# Patient Record
Sex: MALE | Race: WHITE
[De-identification: names, ages, dates, MRNs, and addresses within clinical notes are randomized per-mention and may not be internally consistent; named-entity substitution may affect disease eponyms.]

---

## 2019-08-04 PROBLEM — Z00.00 ENCOUNTER FOR PREVENTIVE HEALTH EXAMINATION: Status: ACTIVE | Noted: 2019-08-04

## 2019-09-03 ENCOUNTER — APPOINTMENT (OUTPATIENT)
Dept: PULMONOLOGY | Facility: CLINIC | Age: 69
End: 2019-09-03
Payer: MEDICARE

## 2019-09-03 VITALS — HEIGHT: 76 IN | WEIGHT: 223 LBS | BODY MASS INDEX: 27.16 KG/M2

## 2019-09-03 VITALS — HEART RATE: 63 BPM | DIASTOLIC BLOOD PRESSURE: 78 MMHG | OXYGEN SATURATION: 92 % | SYSTOLIC BLOOD PRESSURE: 130 MMHG

## 2019-09-03 DIAGNOSIS — Z86.79 PERSONAL HISTORY OF OTHER DISEASES OF THE CIRCULATORY SYSTEM: ICD-10-CM

## 2019-09-03 DIAGNOSIS — Z82.49 FAMILY HISTORY OF ISCHEMIC HEART DISEASE AND OTHER DISEASES OF THE CIRCULATORY SYSTEM: ICD-10-CM

## 2019-09-03 DIAGNOSIS — Z86.39 PERSONAL HISTORY OF OTHER ENDOCRINE, NUTRITIONAL AND METABOLIC DISEASE: ICD-10-CM

## 2019-09-03 DIAGNOSIS — I10 ESSENTIAL (PRIMARY) HYPERTENSION: ICD-10-CM

## 2019-09-03 DIAGNOSIS — Z80.9 FAMILY HISTORY OF MALIGNANT NEOPLASM, UNSPECIFIED: ICD-10-CM

## 2019-09-03 PROCEDURE — 85018 HEMOGLOBIN: CPT | Mod: QW

## 2019-09-03 PROCEDURE — 94727 GAS DIL/WSHOT DETER LNG VOL: CPT

## 2019-09-03 PROCEDURE — 94729 DIFFUSING CAPACITY: CPT

## 2019-09-03 PROCEDURE — 94060 EVALUATION OF WHEEZING: CPT

## 2019-09-03 PROCEDURE — 99204 OFFICE O/P NEW MOD 45 MIN: CPT | Mod: 25

## 2019-09-03 PROCEDURE — G0296 VISIT TO DETERM LDCT ELIG: CPT

## 2019-09-03 PROCEDURE — 99406 BEHAV CHNG SMOKING 3-10 MIN: CPT

## 2019-09-03 PROCEDURE — 94664 DEMO&/EVAL PT USE INHALER: CPT | Mod: 59

## 2019-09-03 NOTE — PHYSICAL EXAM
[General Appearance - Well Developed] : well developed [Normal Appearance] : normal appearance [Well Groomed] : well groomed [No Deformities] : no deformities [General Appearance - Well Nourished] : well nourished [Normal Conjunctiva] : the conjunctiva exhibited no abnormalities [General Appearance - In No Acute Distress] : no acute distress [Eyelids - No Xanthelasma] : the eyelids demonstrated no xanthelasmas [Normal Oropharynx] : normal oropharynx [Neck Appearance] : the appearance of the neck was normal [Neck Cervical Mass (___cm)] : no neck mass was observed [Jugular Venous Distention Increased] : there was no jugular-venous distention [Thyroid Diffuse Enlargement] : the thyroid was not enlarged [Thyroid Nodule] : there were no palpable thyroid nodules [Heart Rate And Rhythm] : heart rate and rhythm were normal [Heart Sounds] : normal S1 and S2 [Murmurs] : no murmurs present [Respiration, Rhythm And Depth] : normal respiratory rhythm and effort [Exaggerated Use Of Accessory Muscles For Inspiration] : no accessory muscle use [Auscultation Breath Sounds / Voice Sounds] : lungs were clear to auscultation bilaterally [Abdomen Soft] : soft [Abdomen Tenderness] : non-tender [Abdomen Mass (___ Cm)] : no abdominal mass palpated [Abnormal Walk] : normal gait [Gait - Sufficient For Exercise Testing] : the gait was sufficient for exercise testing [Nail Clubbing] : no clubbing of the fingernails [Cyanosis, Localized] : no localized cyanosis [Petechial Hemorrhages (___cm)] : no petechial hemorrhages [] : no rash [Skin Turgor] : normal skin turgor [Skin Color & Pigmentation] : normal skin color and pigmentation [Deep Tendon Reflexes (DTR)] : deep tendon reflexes were 2+ and symmetric [Sensation] : the sensory exam was normal to light touch and pinprick [No Focal Deficits] : no focal deficits [FreeTextEntry1] : Normal

## 2019-09-03 NOTE — PROCEDURE
[FreeTextEntry1] : Full pulmonary function test performed demonstrates a severe degree of airways obstruction with a significant postbronchodilator response. Lung volumes demonstrate a markedly elevated FRC, RV and RV/TLC ratio. Diffusion capacity is severely reduced.

## 2019-09-03 NOTE — CONSULT LETTER
[Consult Letter:] : I had the pleasure of evaluating your patient, [unfilled]. [Consult Closing:] : Thank you very much for allowing me to participate in the care of this patient.  If you have any questions, please do not hesitate to contact me. [Sincerely,] : Sincerely, [DrGerson  ___] : Dr. MADDOX [FreeTextEntry3] : Antione Wright MD FCCP\par Pulmonary/Critical Care/Sleep Medicine\par Department of Internal Medicine\par \par [facility] [employer]\par

## 2019-09-03 NOTE — DISCUSSION/SUMMARY
[FreeTextEntry1] : 69-year-old male, seen today for complaints of dyspnea on exertion. Patient's lung function is consistent with stage IV chronic obstructive lung disease. Patient is being started on a combination long-acting beta agonist, anti-cholinergic, inhaled corticosteroid with rescue short-acting bronchodilator. 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 This method was described to the patient with criteria for greater than 30 pack years of smoking, over the age of 55, and screening for 15 years following cessation of tobacco use. Pt disinterested in screening\par \par Smoking cessation was discussed with the patient for approximately 10 minutes. This included the various options including self discontinuation, nicotine replacements, antidepressants, and nicotine antagonist/agonist (Chantix). Patient disinterested in pursuing therapy except for self discontinuation\par \par

## 2019-09-03 NOTE — HISTORY OF PRESENT ILLNESS
[FreeTextEntry1] : Pleasant 69-year-old male with a history of coronary artery disease, and peripheral vascular disease, seen today for pulmonary evaluation. Patient has a greater than 75 pack year history of cigarette smoking. Once smoking 3 packs per day currently at one pack per day. Over the course of this year. The patient has noted decrease in exercise tolerance. He does note morning cough, productive of clear sputum with only rare intermittent wheeze without sleep disturbance. Symptoms are worse during hot and humid conditions. There are no reduction in his and his activities of daily living. He denies any hemoptysis, weight loss.

## 2019-10-15 ENCOUNTER — APPOINTMENT (OUTPATIENT)
Dept: PULMONOLOGY | Facility: CLINIC | Age: 69
End: 2019-10-15
Payer: MEDICARE

## 2019-10-15 VITALS — BODY MASS INDEX: 28.95 KG/M2 | WEIGHT: 228 LBS | HEIGHT: 74.5 IN

## 2019-10-15 VITALS — SYSTOLIC BLOOD PRESSURE: 120 MMHG | DIASTOLIC BLOOD PRESSURE: 70 MMHG | HEART RATE: 60 BPM

## 2019-10-15 VITALS — OXYGEN SATURATION: 93 %

## 2019-10-15 DIAGNOSIS — I25.10 ATHEROSCLEROTIC HEART DISEASE OF NATIVE CORONARY ARTERY W/OUT ANGINA PECTORIS: ICD-10-CM

## 2019-10-15 PROCEDURE — 94010 BREATHING CAPACITY TEST: CPT

## 2019-10-15 PROCEDURE — 99214 OFFICE O/P EST MOD 30 MIN: CPT | Mod: 25

## 2019-10-15 NOTE — DISCUSSION/SUMMARY
[Improving] : improving [COPD] : chronic obstructive pulmonary disease [Stage IV (Very Severe)] : stage IV (very severe) [None] : There are no changes in medication management [FreeTextEntry1] : Smoking cessation was discussed with the patient for approximately 10 minutes. This included the various options including self discontinuation, nicotine replacements, antidepressants, and nicotine antagonist/agonist (Chantix).\par \par Purse lip breathing demonstrated.\par \par Refusing nebulizer at this time as well as CCT

## 2019-10-15 NOTE — PHYSICAL EXAM
[Normal Appearance] : normal appearance [General Appearance - Well Developed] : well developed [General Appearance - Well Nourished] : well nourished [No Deformities] : no deformities [Well Groomed] : well groomed [Normal Conjunctiva] : the conjunctiva exhibited no abnormalities [General Appearance - In No Acute Distress] : no acute distress [Normal Oropharynx] : normal oropharynx [Eyelids - No Xanthelasma] : the eyelids demonstrated no xanthelasmas [Neck Appearance] : the appearance of the neck was normal [Neck Cervical Mass (___cm)] : no neck mass was observed [Thyroid Diffuse Enlargement] : the thyroid was not enlarged [Jugular Venous Distention Increased] : there was no jugular-venous distention [Thyroid Nodule] : there were no palpable thyroid nodules [Heart Sounds] : normal S1 and S2 [Heart Rate And Rhythm] : heart rate and rhythm were normal [Murmurs] : no murmurs present [Exaggerated Use Of Accessory Muscles For Inspiration] : no accessory muscle use [Respiration, Rhythm And Depth] : normal respiratory rhythm and effort [Auscultation Breath Sounds / Voice Sounds] : lungs were clear to auscultation bilaterally [Abdomen Tenderness] : non-tender [Abdomen Soft] : soft [Abdomen Mass (___ Cm)] : no abdominal mass palpated [Nail Clubbing] : no clubbing of the fingernails [Abnormal Walk] : normal gait [Gait - Sufficient For Exercise Testing] : the gait was sufficient for exercise testing [Petechial Hemorrhages (___cm)] : no petechial hemorrhages [Cyanosis, Localized] : no localized cyanosis [Sensation] : the sensory exam was normal to light touch and pinprick [] : no ischemic changes [Deep Tendon Reflexes (DTR)] : deep tendon reflexes were 2+ and symmetric [No Focal Deficits] : no focal deficits [FreeTextEntry1] : Normal

## 2020-02-11 ENCOUNTER — APPOINTMENT (OUTPATIENT)
Dept: PULMONOLOGY | Facility: CLINIC | Age: 70
End: 2020-02-11
Payer: MEDICARE

## 2020-02-11 VITALS — BODY MASS INDEX: 30.02 KG/M2 | WEIGHT: 237 LBS

## 2020-02-11 VITALS — HEART RATE: 66 BPM | OXYGEN SATURATION: 91 % | SYSTOLIC BLOOD PRESSURE: 130 MMHG | DIASTOLIC BLOOD PRESSURE: 80 MMHG

## 2020-02-11 VITALS — BODY MASS INDEX: 30.02 KG/M2 | HEIGHT: 74.5 IN

## 2020-02-11 PROCEDURE — 99214 OFFICE O/P EST MOD 30 MIN: CPT | Mod: 25

## 2020-02-11 PROCEDURE — 94010 BREATHING CAPACITY TEST: CPT

## 2020-02-11 RX ORDER — AMLODIPINE BESYLATE 5 MG/1
5 TABLET ORAL
Refills: 0 | Status: ACTIVE | COMMUNITY

## 2020-02-11 RX ORDER — EZETIMIBE 10 MG/1
10 TABLET ORAL
Refills: 0 | Status: ACTIVE | COMMUNITY

## 2020-02-11 NOTE — PHYSICAL EXAM
[No Acute Distress] : no acute distress [Normal Oropharynx] : normal oropharynx [Normal Appearance] : normal appearance [Normal Rate/Rhythm] : normal rate/rhythm [No Neck Mass] : no neck mass [No Murmurs] : no murmurs [No Resp Distress] : no resp distress [Normal S1, S2] : normal s1, s2 [No Abnormalities] : no abnormalities [Clear to Auscultation Bilaterally] : clear to auscultation bilaterally [Benign] : benign [Normal Gait] : normal gait [No Cyanosis] : no cyanosis [No Edema] : no edema [No Clubbing] : no clubbing [Normal Color/ Pigmentation] : normal color/ pigmentation [FROM] : FROM [No Focal Deficits] : no focal deficits [Oriented x3] : oriented x3 [Normal Affect] : normal affect

## 2020-02-11 NOTE — HISTORY OF PRESENT ILLNESS
[Doing Well] : doing well [Difficulty Breathing During Exertion] : improved dyspnea on exertion [Cough] : denies coughing [Feelings Of Weakness On Exertion] : improved exercise intolerance [Coughing Up Sputum] : denies coughing up sputum [Regional Soft Tissue Swelling Both Lower Extremities] : denies lower extremity edema [Wheezing] : improved wheezing [PFTs] : pulmonary function tests [>= 30 pack years] : >= 30 pack years [Current] : current [Class II - Mild Symptoms and Slight Limitations] : II [Wt Gain ___ Lbs] : no recent weight gain [TextBox_11] : 1/2 [de-identified] : PVD

## 2020-02-11 NOTE — DISCUSSION/SUMMARY
[Improving] : improving [COPD] : chronic obstructive pulmonary disease [Stage IV (Very Severe)] : stage IV (very severe) [None] : There are no changes in medication management [Responding to Treatment] : responding to treatment [FreeTextEntry1] : Smoking cessation was discussed with the patient for approximately 10 minutes. This included the various options including self discontinuation, nicotine replacements, antidepressants, and nicotine antagonist/agonist (Chantix).\par \par Still refusing CCT

## 2020-02-20 ENCOUNTER — RX RENEWAL (OUTPATIENT)
Age: 70
End: 2020-02-20

## 2020-09-01 ENCOUNTER — APPOINTMENT (OUTPATIENT)
Dept: PULMONOLOGY | Facility: CLINIC | Age: 70
End: 2020-09-01
Payer: MEDICARE

## 2020-09-01 ENCOUNTER — TRANSCRIPTION ENCOUNTER (OUTPATIENT)
Age: 70
End: 2020-09-01

## 2020-09-01 VITALS
HEART RATE: 74 BPM | WEIGHT: 245 LBS | BODY MASS INDEX: 31.44 KG/M2 | OXYGEN SATURATION: 93 % | DIASTOLIC BLOOD PRESSURE: 74 MMHG | HEIGHT: 74 IN | SYSTOLIC BLOOD PRESSURE: 138 MMHG

## 2020-09-01 DIAGNOSIS — Z71.6 TOBACCO ABUSE COUNSELING: ICD-10-CM

## 2020-09-01 DIAGNOSIS — Z12.9 ENCOUNTER FOR SCREENING FOR MALIGNANT NEOPLASM, SITE UNSPECIFIED: ICD-10-CM

## 2020-09-01 PROCEDURE — 99406 BEHAV CHNG SMOKING 3-10 MIN: CPT

## 2020-09-01 PROCEDURE — G0296 VISIT TO DETERM LDCT ELIG: CPT

## 2020-09-01 PROCEDURE — 99214 OFFICE O/P EST MOD 30 MIN: CPT | Mod: 25

## 2020-09-01 NOTE — DISCUSSION/SUMMARY
[COPD] : chronic obstructive pulmonary disease [Improving] : improving [Responding to Treatment] : responding to treatment [Stage IV (Very Severe)] : stage IV (very severe) [None] : There are no changes in medication management

## 2020-09-01 NOTE — HISTORY OF PRESENT ILLNESS
[Doing Well] : doing well [Difficulty Breathing During Exertion] : improved dyspnea on exertion [Feelings Of Weakness On Exertion] : improved exercise intolerance [Cough] : denies coughing [Coughing Up Sputum] : denies coughing up sputum [Wheezing] : improved wheezing [Regional Soft Tissue Swelling Both Lower Extremities] : denies lower extremity edema [PFTs] : pulmonary function tests [Cigarette ___ppd] : [unfilled] cigarette pack(s) per day [Wt Gain ___ Lbs] : no recent weight gain [de-identified] : PVD [de-identified] : Pt refusing CCT screening [TextBox_3] : 4

## 2020-10-16 DIAGNOSIS — Z23 ENCOUNTER FOR IMMUNIZATION: ICD-10-CM

## 2021-02-22 ENCOUNTER — APPOINTMENT (OUTPATIENT)
Dept: PULMONOLOGY | Facility: CLINIC | Age: 71
End: 2021-02-22
Payer: MEDICARE

## 2021-02-22 VITALS
RESPIRATION RATE: 18 BRPM | OXYGEN SATURATION: 94 % | HEIGHT: 74 IN | HEART RATE: 76 BPM | SYSTOLIC BLOOD PRESSURE: 130 MMHG | DIASTOLIC BLOOD PRESSURE: 74 MMHG | WEIGHT: 247 LBS | TEMPERATURE: 98 F | BODY MASS INDEX: 31.7 KG/M2

## 2021-02-22 PROCEDURE — 90732 PPSV23 VACC 2 YRS+ SUBQ/IM: CPT

## 2021-02-22 PROCEDURE — G0296 VISIT TO DETERM LDCT ELIG: CPT

## 2021-02-22 PROCEDURE — G0009: CPT

## 2021-02-22 PROCEDURE — 99214 OFFICE O/P EST MOD 30 MIN: CPT | Mod: 25

## 2021-02-22 PROCEDURE — 99406 BEHAV CHNG SMOKING 3-10 MIN: CPT

## 2021-02-22 NOTE — CONSULT LETTER
[Dear  ___] : Dear  [unfilled], [Please see my note below.] : Please see my note below. [Consult Closing:] : Thank you very much for allowing me to participate in the care of this patient.  If you have any questions, please do not hesitate to contact me. [Sincerely,] : Sincerely, [FreeTextEntry3] : Antione Wright MD FCCP\par Pulmonary/Critical Care/Sleep Medicine\par Department of Internal Medicine\par \par [facility] [employer]\par

## 2021-02-22 NOTE — HISTORY OF PRESENT ILLNESS
[Doing Well] : doing well [Difficulty Breathing During Exertion] : improved dyspnea on exertion [Feelings Of Weakness On Exertion] : improved exercise intolerance [Cough] : denies coughing [Coughing Up Sputum] : denies coughing up sputum [Wheezing] : improved wheezing [Regional Soft Tissue Swelling Both Lower Extremities] : denies lower extremity edema [Cigarette ___ppd] : [unfilled] cigarette pack(s) per day [PFTs] : pulmonary function tests [Wt Gain ___ Lbs] : no recent weight gain [de-identified] : PVD [de-identified] : Pt refusing CCT screening [ESS] : 0

## 2021-02-22 NOTE — COUNSELING
[Risk of tobacco use and health benefits of smoking cessation discussed] : Risk of tobacco use and health benefits of smoking cessation discussed [Cessation strategies including cessation program discussed] : Cessation strategies including cessation program discussed [Use of nicotine replacement therapies and other medications discussed] : Use of nicotine replacement therapies and other medications discussed [Encouraged to pick a quit date and identify support needed to quit] : Encouraged to pick a quit date and identify support needed to quit [Willing to Quit Smoking] : Willing to quit smoking [Tobacco Use Cessation Intermediate Greater Than 3 Minutes Up to 10 Minutes] : Tobacco Use Cessation Intermediate Greater Than 3 Minutes Up to 10 Minutes [FreeTextEntry2] : current smoker [ - Annual Lung Cancer Screening/Share Decision Making Discussion] : Annual Lung Cancer Screening/Share Decision Making Discussion. (I have advised this patient to have a Low Dose CT (LDCT) scan of the lungs and have discussed the following with the patient in a shared decision making discussion:   Benefits of Detection and Early Treatment: There is adequate evidence that annual screening for lung cancer with LDCT in a population of high-risk persons can prevent a substantial number of lung cancer–related deaths. The magnitude of benefit depends on the individual patient's risk for lung cancer, as those who are at highest risk are most likely to benefit. Screening cannot prevent most lung cancer–related deaths, and does not replace smoking cessation. Harms of Detection and Early Intervention and Treatment: The harms associated with LDCT screening include false-negative and false-positive results, incidental findings, over diagnosis, and radiation exposure. False-positive LDCT results occur in a substantial proportion of screened persons; 95% of all positive results do not lead to a diagnosis of cancer. In a high-quality screening program, further imaging can resolve most false-positive results; however, some patients may require invasive procedures. Radiation harms, including cancer resulting from cumulative exposure to radiation, vary depending on the age at the start of screening; the number of scans received; and the person's exposure to other sources of radiation, particularly other medical imaging.)

## 2021-07-27 ENCOUNTER — NON-APPOINTMENT (OUTPATIENT)
Age: 71
End: 2021-07-27

## 2021-08-16 ENCOUNTER — APPOINTMENT (OUTPATIENT)
Dept: DISASTER EMERGENCY | Facility: CLINIC | Age: 71
End: 2021-08-16

## 2021-09-13 DIAGNOSIS — Z01.818 ENCOUNTER FOR OTHER PREPROCEDURAL EXAMINATION: ICD-10-CM

## 2021-09-14 ENCOUNTER — APPOINTMENT (OUTPATIENT)
Dept: DISASTER EMERGENCY | Facility: CLINIC | Age: 71
End: 2021-09-14

## 2021-09-15 LAB — SARS-COV-2 N GENE NPH QL NAA+PROBE: NOT DETECTED

## 2021-09-17 ENCOUNTER — APPOINTMENT (OUTPATIENT)
Dept: PULMONOLOGY | Facility: CLINIC | Age: 71
End: 2021-09-17

## 2021-09-17 ENCOUNTER — APPOINTMENT (OUTPATIENT)
Dept: PULMONOLOGY | Facility: CLINIC | Age: 71
End: 2021-09-17
Payer: MEDICARE

## 2021-09-17 VITALS — HEIGHT: 75 IN | WEIGHT: 252 LBS | BODY MASS INDEX: 31.33 KG/M2

## 2021-09-17 VITALS — DIASTOLIC BLOOD PRESSURE: 70 MMHG | SYSTOLIC BLOOD PRESSURE: 124 MMHG

## 2021-09-17 VITALS — OXYGEN SATURATION: 95 % | HEART RATE: 68 BPM

## 2021-09-17 PROCEDURE — G0296 VISIT TO DETERM LDCT ELIG: CPT

## 2021-09-17 PROCEDURE — 94010 BREATHING CAPACITY TEST: CPT

## 2021-09-17 PROCEDURE — 99214 OFFICE O/P EST MOD 30 MIN: CPT | Mod: 25

## 2021-09-17 NOTE — CONSULT LETTER
[Dear  ___] : Dear  [unfilled], [Please see my note below.] : Please see my note below. [Consult Closing:] : Thank you very much for allowing me to participate in the care of this patient.  If you have any questions, please do not hesitate to contact me. [Sincerely,] : Sincerely, [FreeTextEntry3] : Antione Wirght MD FCCP\par Pulmonary/Critical Care/Sleep Medicine\par Department of Internal Medicine\par \par [facility] [employer]\par

## 2021-09-17 NOTE — HISTORY OF PRESENT ILLNESS
[Doing Well] : doing well [Difficulty Breathing During Exertion] : improved dyspnea on exertion [Feelings Of Weakness On Exertion] : improved exercise intolerance [Cough] : denies coughing [Coughing Up Sputum] : denies coughing up sputum [Wheezing] : improved wheezing [Regional Soft Tissue Swelling Both Lower Extremities] : denies lower extremity edema [Cigarette ___ppd] : [unfilled] cigarette pack(s) per day [PFTs] : pulmonary function tests [Wt Gain ___ Lbs] : no recent weight gain [de-identified] : PVD [de-identified] : Pt refusing CCT screening [ESS] : 0

## 2021-09-17 NOTE — DISCUSSION/SUMMARY
[COPD] : chronic obstructive pulmonary disease [Responding to Treatment] : responding to treatment [Stage IV (Very Severe)] : stage IV (very severe) [None] : There are no changes in medication management [Decompensated] : decompensated [FreeTextEntry1] : Pt has decided not to pursue LDCT

## 2021-09-17 NOTE — COUNSELING
[Willing to Quit Smoking] : Willing to quit smoking [ - Annual Lung Cancer Screening/Share Decision Making Discussion] : Annual Lung Cancer Screening/Share Decision Making Discussion. (I have advised this patient to have a Low Dose CT (LDCT) scan of the lungs and have discussed the following with the patient in a shared decision making discussion:   Benefits of Detection and Early Treatment: There is adequate evidence that annual screening for lung cancer with LDCT in a population of high-risk persons can prevent a substantial number of lung cancer–related deaths. The magnitude of benefit depends on the individual patient's risk for lung cancer, as those who are at highest risk are most likely to benefit. Screening cannot prevent most lung cancer–related deaths, and does not replace smoking cessation. Harms of Detection and Early Intervention and Treatment: The harms associated with LDCT screening include false-negative and false-positive results, incidental findings, over diagnosis, and radiation exposure. False-positive LDCT results occur in a substantial proportion of screened persons; 95% of all positive results do not lead to a diagnosis of cancer. In a high-quality screening program, further imaging can resolve most false-positive results; however, some patients may require invasive procedures. Radiation harms, including cancer resulting from cumulative exposure to radiation, vary depending on the age at the start of screening; the number of scans received; and the person's exposure to other sources of radiation, particularly other medical imaging.)

## 2021-10-06 PROBLEM — I10 ESSENTIAL HYPERTENSION: Status: ACTIVE | Noted: 2019-09-03

## 2021-11-11 ENCOUNTER — APPOINTMENT (OUTPATIENT)
Dept: DISASTER EMERGENCY | Facility: CLINIC | Age: 71
End: 2021-11-11

## 2021-11-12 LAB — SARS-COV-2 N GENE NPH QL NAA+PROBE: NOT DETECTED

## 2021-11-15 ENCOUNTER — APPOINTMENT (OUTPATIENT)
Dept: PULMONOLOGY | Facility: CLINIC | Age: 71
End: 2021-11-15
Payer: MEDICARE

## 2021-11-15 VITALS — BODY MASS INDEX: 30.59 KG/M2 | HEIGHT: 75 IN | WEIGHT: 246 LBS

## 2021-11-15 VITALS — SYSTOLIC BLOOD PRESSURE: 120 MMHG | DIASTOLIC BLOOD PRESSURE: 60 MMHG

## 2021-11-15 VITALS — RESPIRATION RATE: 16 BRPM | OXYGEN SATURATION: 95 % | HEART RATE: 54 BPM

## 2021-11-15 PROCEDURE — 94010 BREATHING CAPACITY TEST: CPT

## 2021-11-15 PROCEDURE — 99214 OFFICE O/P EST MOD 30 MIN: CPT | Mod: 25

## 2021-11-15 RX ORDER — PREDNISONE 10 MG/1
10 TABLET ORAL
Qty: 42 | Refills: 0 | Status: DISCONTINUED | COMMUNITY
Start: 2021-09-17 | End: 2021-11-15

## 2021-11-15 NOTE — HISTORY OF PRESENT ILLNESS
[Former] : former [Doing Well] : doing well [Difficulty Breathing During Exertion] : improved dyspnea on exertion [Feelings Of Weakness On Exertion] : improved exercise intolerance [Cough] : denies coughing [Coughing Up Sputum] : denies coughing up sputum [Wheezing] : improved wheezing [Regional Soft Tissue Swelling Both Lower Extremities] : denies lower extremity edema [Cigarette ___ppd] : [unfilled] cigarette pack(s) per day [PFTs] : pulmonary function tests [YearQuit] : 2021 [TextBox_29] : STOPPED SMOKING SEVEN WKS AGO [ESS] : 0 [Wt Gain ___ Lbs] : no recent weight gain [de-identified] : PVD [de-identified] : Pt refusing CCT screening

## 2021-11-15 NOTE — CONSULT LETTER
[Dear  ___] : Dear  [unfilled], [Please see my note below.] : Please see my note below. [Consult Closing:] : Thank you very much for allowing me to participate in the care of this patient.  If you have any questions, please do not hesitate to contact me. [Sincerely,] : Sincerely, [FreeTextEntry3] : Antione Wright MD FCCP\par Pulmonary/Critical Care/Sleep Medicine\par Department of Internal Medicine\par \par [facility] [employer]\par

## 2022-05-16 ENCOUNTER — APPOINTMENT (OUTPATIENT)
Dept: PULMONOLOGY | Facility: CLINIC | Age: 72
End: 2022-05-16
Payer: MEDICARE

## 2022-05-16 VITALS — HEIGHT: 74 IN | BODY MASS INDEX: 34.14 KG/M2 | WEIGHT: 266 LBS

## 2022-05-16 PROCEDURE — 94010 BREATHING CAPACITY TEST: CPT

## 2022-06-23 ENCOUNTER — APPOINTMENT (OUTPATIENT)
Dept: PULMONOLOGY | Facility: CLINIC | Age: 72
End: 2022-06-23
Payer: MEDICARE

## 2022-06-23 VITALS
HEART RATE: 73 BPM | WEIGHT: 250 LBS | SYSTOLIC BLOOD PRESSURE: 140 MMHG | OXYGEN SATURATION: 96 % | RESPIRATION RATE: 16 BRPM | BODY MASS INDEX: 32.1 KG/M2 | DIASTOLIC BLOOD PRESSURE: 80 MMHG

## 2022-06-23 PROCEDURE — 99214 OFFICE O/P EST MOD 30 MIN: CPT

## 2022-06-23 RX ORDER — APIXABAN 5 MG/1
5 TABLET, FILM COATED ORAL
Qty: 60 | Refills: 0 | Status: ACTIVE | COMMUNITY
Start: 2022-04-07

## 2022-06-23 NOTE — DISCUSSION/SUMMARY
[COPD] : chronic obstructive pulmonary disease [Improving] : improving [Responding to Treatment] : responding to treatment [Stage IV (Very Severe)] : stage IV (very severe) [None] : There are no changes in medication management [FreeTextEntry1] : Refusing LDCT [Pulmonary Rehabilitation] : pulmonary rehabilitation [de-identified] : in fall

## 2022-06-23 NOTE — HISTORY OF PRESENT ILLNESS
[Former] : former [>= 20 pack years] : >= 20 pack years [Doing Well] : doing well [Difficulty Breathing During Exertion] : improved dyspnea on exertion [Feelings Of Weakness On Exertion] : improved exercise intolerance [Cough] : denies coughing [Coughing Up Sputum] : denies coughing up sputum [Wheezing] : improved wheezing [Regional Soft Tissue Swelling Both Lower Extremities] : denies lower extremity edema [Cigarette ___ppd] : [unfilled] cigarette pack(s) per day [PFTs] : pulmonary function tests [YearQuit] : 2021 [ESS] : 0 [Wt Gain ___ Lbs] : no recent weight gain [de-identified] : PVD [de-identified] : Pt refusing CCT screening

## 2022-06-23 NOTE — CONSULT LETTER
[Dear  ___] : Dear  [unfilled], [Please see my note below.] : Please see my note below. [Consult Closing:] : Thank you very much for allowing me to participate in the care of this patient.  If you have any questions, please do not hesitate to contact me. [Sincerely,] : Sincerely, [FreeTextEntry3] : Antione Wright MD FCCP\par Pulmonary/Critical Care/Sleep Medicine\par Department of Internal Medicine\par \par [facility] [employer]\par

## 2022-10-17 ENCOUNTER — APPOINTMENT (OUTPATIENT)
Dept: PULMONOLOGY | Facility: CLINIC | Age: 72
End: 2022-10-17

## 2022-10-17 VITALS
DIASTOLIC BLOOD PRESSURE: 76 MMHG | HEART RATE: 71 BPM | RESPIRATION RATE: 16 BRPM | OXYGEN SATURATION: 95 % | SYSTOLIC BLOOD PRESSURE: 134 MMHG

## 2022-10-17 VITALS — WEIGHT: 230 LBS | BODY MASS INDEX: 29.52 KG/M2 | HEIGHT: 74 IN

## 2022-10-17 DIAGNOSIS — I73.9 PERIPHERAL VASCULAR DISEASE, UNSPECIFIED: ICD-10-CM

## 2022-10-17 LAB — SARS-COV-2 N GENE NPH QL NAA+PROBE: NOT DETECTED

## 2022-10-17 PROCEDURE — G0296 VISIT TO DETERM LDCT ELIG: CPT

## 2022-10-17 PROCEDURE — 94010 BREATHING CAPACITY TEST: CPT

## 2022-10-17 PROCEDURE — 99214 OFFICE O/P EST MOD 30 MIN: CPT | Mod: 25

## 2022-10-17 RX ORDER — METOPROLOL SUCCINATE 50 MG/1
50 TABLET, EXTENDED RELEASE ORAL
Refills: 0 | Status: DISCONTINUED | COMMUNITY
End: 2022-10-17

## 2022-10-17 RX ORDER — AMOXICILLIN 500 MG/1
500 CAPSULE ORAL
Qty: 21 | Refills: 0 | Status: DISCONTINUED | COMMUNITY
Start: 2022-03-10 | End: 2022-10-17

## 2022-10-17 RX ORDER — BLOOD SUGAR DIAGNOSTIC
STRIP MISCELLANEOUS
Qty: 8 | Refills: 0 | Status: DISCONTINUED | COMMUNITY
Start: 2022-05-19 | End: 2022-10-17

## 2022-10-17 RX ORDER — CHLORHEXIDINE GLUCONATE, 0.12% ORAL RINSE 1.2 MG/ML
0.12 SOLUTION DENTAL
Qty: 473 | Refills: 0 | Status: DISCONTINUED | COMMUNITY
Start: 2022-03-10 | End: 2022-10-17

## 2022-10-17 NOTE — HISTORY OF PRESENT ILLNESS
[Former] : former [>= 20 pack years] : >= 20 pack years [Doing Well] : doing well [Difficulty Breathing During Exertion] : stable dyspnea on exertion [Feelings Of Weakness On Exertion] : stable exercise intolerance [Cough] : denies coughing [Coughing Up Sputum] : denies coughing up sputum [Wheezing] : resolved wheezing [Regional Soft Tissue Swelling Both Lower Extremities] : denies lower extremity edema [Cigarette ___ppd] : [unfilled] cigarette pack(s) per day [PFTs] : pulmonary function tests [YearQuit] : 2021 [ESS] : 0 [Wt Gain ___ Lbs] : no recent weight gain [de-identified] : PVD [de-identified] : Pt refusing CCT screening

## 2022-10-17 NOTE — DISCUSSION/SUMMARY
[COPD] : chronic obstructive pulmonary disease [Improving] : improving [Responding to Treatment] : responding to treatment [Stage IV (Very Severe)] : stage IV (very severe) [None] : There are no changes in medication management [Pulmonary Rehabilitation] : pulmonary rehabilitation [FreeTextEntry1] : Refusing LDCT [de-identified] : in fall

## 2022-10-17 NOTE — CONSULT LETTER
[Dear  ___] : Dear  [unfilled], [Please see my note below.] : Please see my note below. [Consult Closing:] : Thank you very much for allowing me to participate in the care of this patient.  If you have any questions, please do not hesitate to contact me. [Sincerely,] : Sincerely, [FreeTextEntry3] : Antione Wright MD FCCP\par Pulmonary/Critical Care/Sleep Medicine\par Department of Internal Medicine\par \par [facility] [employer] [facility]\par

## 2023-02-15 ENCOUNTER — APPOINTMENT (OUTPATIENT)
Dept: PULMONOLOGY | Facility: CLINIC | Age: 73
End: 2023-02-15
Payer: MEDICARE

## 2023-02-15 VITALS — BODY MASS INDEX: 29.34 KG/M2 | HEIGHT: 75 IN | WEIGHT: 236 LBS

## 2023-02-15 VITALS
RESPIRATION RATE: 16 BRPM | HEART RATE: 68 BPM | SYSTOLIC BLOOD PRESSURE: 120 MMHG | OXYGEN SATURATION: 97 % | DIASTOLIC BLOOD PRESSURE: 60 MMHG

## 2023-02-15 PROCEDURE — 94010 BREATHING CAPACITY TEST: CPT

## 2023-02-15 PROCEDURE — 99214 OFFICE O/P EST MOD 30 MIN: CPT | Mod: 25

## 2023-02-15 RX ORDER — ASPIRIN 325 MG/1
325 TABLET, FILM COATED ORAL
Refills: 0 | Status: COMPLETED | COMMUNITY
End: 2023-02-15

## 2023-02-15 RX ORDER — METOPROLOL SUCCINATE 25 MG/1
25 TABLET, EXTENDED RELEASE ORAL
Qty: 90 | Refills: 0 | Status: COMPLETED | COMMUNITY
Start: 2022-06-15 | End: 2023-02-15

## 2023-02-15 NOTE — HISTORY OF PRESENT ILLNESS
[Doing Well] : doing well [Difficulty Breathing During Exertion] : stable dyspnea on exertion [Feelings Of Weakness On Exertion] : stable exercise intolerance [Cough] : denies coughing [Coughing Up Sputum] : denies coughing up sputum [Wheezing] : resolved wheezing [Regional Soft Tissue Swelling Both Lower Extremities] : denies lower extremity edema [Cigarette ___ppd] : [unfilled] cigarette pack(s) per day [PFTs] : pulmonary function tests [ESS] : 0 [Wt Gain ___ Lbs] : no recent weight gain [de-identified] : PVD [de-identified] : Covid 19 12/26-1/1 [de-identified] : Pt refusing CCT screening.  Legs feel somewhat weaker since COVID.

## 2023-02-15 NOTE — CONSULT LETTER
[Dear  ___] : Dear  [unfilled], [Please see my note below.] : Please see my note below. [Consult Closing:] : Thank you very much for allowing me to participate in the care of this patient.  If you have any questions, please do not hesitate to contact me. [Sincerely,] : Sincerely, [FreeTextEntry3] : Antione Wright MD FCCP\par Pulmonary/Critical Care/Sleep Medicine\par Department of Internal Medicine\par \par [facility]\par

## 2023-02-15 NOTE — PROCEDURE
[___%] : last visit [unfilled]U% [Spirometry] : improving [___%] : current visit [unfilled]U% [FreeTextEntry1] : \par

## 2023-02-15 NOTE — DISCUSSION/SUMMARY
[COPD] : chronic obstructive pulmonary disease [Improving] : improving [Responding to Treatment] : responding to treatment [Stage IV (Very Severe)] : stage IV (very severe) [None] : There are no changes in medication management [Pulmonary Rehabilitation] : pulmonary rehabilitation [FreeTextEntry1] : Refusing LDCT [de-identified] : in fall

## 2023-08-16 ENCOUNTER — APPOINTMENT (OUTPATIENT)
Dept: PULMONOLOGY | Facility: CLINIC | Age: 73
End: 2023-08-16
Payer: MEDICARE

## 2023-08-16 VITALS — HEIGHT: 75 IN | BODY MASS INDEX: 28.1 KG/M2 | WEIGHT: 226 LBS

## 2023-08-16 PROCEDURE — 94010 BREATHING CAPACITY TEST: CPT

## 2023-09-27 ENCOUNTER — APPOINTMENT (OUTPATIENT)
Dept: PULMONOLOGY | Facility: CLINIC | Age: 73
End: 2023-09-27
Payer: MEDICARE

## 2023-09-27 VITALS
DIASTOLIC BLOOD PRESSURE: 66 MMHG | WEIGHT: 225 LBS | BODY MASS INDEX: 27.98 KG/M2 | HEIGHT: 75 IN | RESPIRATION RATE: 16 BRPM | SYSTOLIC BLOOD PRESSURE: 118 MMHG

## 2023-09-27 VITALS — OXYGEN SATURATION: 98 % | HEART RATE: 46 BPM

## 2023-09-27 PROCEDURE — 99214 OFFICE O/P EST MOD 30 MIN: CPT

## 2023-09-27 RX ORDER — FLUTICASONE FUROATE, UMECLIDINIUM BROMIDE AND VILANTEROL TRIFENATATE 100; 62.5; 25 UG/1; UG/1; UG/1
100-62.5-25 POWDER RESPIRATORY (INHALATION)
Qty: 180 | Refills: 3 | Status: DISCONTINUED | COMMUNITY
Start: 2019-09-03 | End: 2023-09-27

## 2023-12-20 ENCOUNTER — RX RENEWAL (OUTPATIENT)
Age: 73
End: 2023-12-20

## 2024-03-26 ENCOUNTER — APPOINTMENT (OUTPATIENT)
Dept: PULMONOLOGY | Facility: CLINIC | Age: 74
End: 2024-03-26
Payer: MEDICARE

## 2024-03-26 VITALS — BODY MASS INDEX: 30.21 KG/M2 | HEIGHT: 75 IN | WEIGHT: 243 LBS

## 2024-03-26 VITALS
OXYGEN SATURATION: 96 % | SYSTOLIC BLOOD PRESSURE: 122 MMHG | DIASTOLIC BLOOD PRESSURE: 66 MMHG | HEART RATE: 93 BPM | RESPIRATION RATE: 16 BRPM

## 2024-03-26 DIAGNOSIS — J44.9 CHRONIC OBSTRUCTIVE PULMONARY DISEASE, UNSPECIFIED: ICD-10-CM

## 2024-03-26 DIAGNOSIS — Z87.891 PERSONAL HISTORY OF NICOTINE DEPENDENCE: ICD-10-CM

## 2024-03-26 PROCEDURE — 99214 OFFICE O/P EST MOD 30 MIN: CPT | Mod: 25

## 2024-03-26 PROCEDURE — G0296 VISIT TO DETERM LDCT ELIG: CPT

## 2024-03-26 PROCEDURE — 94010 BREATHING CAPACITY TEST: CPT

## 2024-03-26 RX ORDER — ALBUTEROL SULFATE 90 UG/1
108 (90 BASE) INHALANT RESPIRATORY (INHALATION)
Qty: 1 | Refills: 5 | Status: ACTIVE | COMMUNITY
Start: 2019-09-03 | End: 1900-01-01

## 2024-03-26 RX ORDER — SIMVASTATIN 40 MG/1
40 TABLET, FILM COATED ORAL
Refills: 0 | Status: DISCONTINUED | COMMUNITY
End: 2024-03-26

## 2024-03-26 RX ORDER — ASPIRIN 81 MG
81 TABLET, DELAYED RELEASE (ENTERIC COATED) ORAL
Refills: 0 | Status: ACTIVE | COMMUNITY

## 2024-03-26 RX ORDER — FLUTICASONE FUROATE, UMECLIDINIUM BROMIDE AND VILANTEROL TRIFENATATE 100; 62.5; 25 UG/1; UG/1; UG/1
100-62.5-25 POWDER RESPIRATORY (INHALATION)
Qty: 180 | Refills: 3 | Status: ACTIVE | COMMUNITY
Start: 2022-12-06 | End: 1900-01-01

## 2024-03-26 RX ORDER — ALBUTEROL SULFATE 2.5 MG/3ML
(2.5 MG/3ML) SOLUTION RESPIRATORY (INHALATION)
Qty: 1 | Refills: 5 | Status: ACTIVE | COMMUNITY
Start: 2020-02-11 | End: 1900-01-01

## 2024-03-26 RX ORDER — BEMPEDOIC ACID 180 MG/1
180 TABLET, FILM COATED ORAL
Refills: 0 | Status: ACTIVE | COMMUNITY

## 2024-03-26 NOTE — PHYSICAL EXAM
[No Acute Distress] : no acute distress [Normal Oropharynx] : normal oropharynx [IV] : Mallampati Class: IV [Normal Appearance] : normal appearance [Neck Circumference: ___] : neck circumference: [unfilled] [No Neck Mass] : no neck mass [Normal Rate/Rhythm] : normal rate/rhythm [No Murmurs] : no murmurs [Normal S1, S2] : normal s1, s2 [No Resp Distress] : no resp distress [No Abnormalities] : no abnormalities [Clear to Auscultation Bilaterally] : clear to auscultation bilaterally [Benign] : benign [Normal Gait] : normal gait [No Clubbing] : no clubbing [No Cyanosis] : no cyanosis [FROM] : FROM [No Edema] : no edema [Normal Color/ Pigmentation] : normal color/ pigmentation [No Focal Deficits] : no focal deficits [Oriented x3] : oriented x3 [Normal Affect] : normal affect

## 2024-03-26 NOTE — DISCUSSION/SUMMARY
[COPD] : chronic obstructive pulmonary disease [Improving] : improving [Responding to Treatment] : responding to treatment [Stage IV (Very Severe)] : stage IV (very severe) [None] : There are no changes in medication management [Patient] : the patient [FreeTextEntry1] : Refusing LDCT

## 2024-03-26 NOTE — CONSULT LETTER
[Dear  ___] : Dear  [unfilled], [Please see my note below.] : Please see my note below. [Consult Closing:] : Thank you very much for allowing me to participate in the care of this patient.  If you have any questions, please do not hesitate to contact me. [Sincerely,] : Sincerely, [FreeTextEntry3] : Antione Wright MD FCCP\par  Pulmonary/Critical Care/Sleep Medicine\par  Department of Internal Medicine\par  \par  [facility] [employer]\par

## 2024-03-26 NOTE — HISTORY OF PRESENT ILLNESS
[Doing Well] : doing well [Difficulty Breathing During Exertion] : stable dyspnea on exertion [Feelings Of Weakness On Exertion] : stable exercise intolerance [Cough] : denies coughing [Wheezing] : resolved wheezing [Coughing Up Sputum] : denies coughing up sputum [Regional Soft Tissue Swelling Both Lower Extremities] : denies lower extremity edema [Cigarette ___ppd] : [unfilled] cigarette pack(s) per day [PFTs] : pulmonary function tests [ESS] : 0 [Wt Gain ___ Lbs] : no recent weight gain [de-identified] : PVD [de-identified] : walkinging 2.5miles per day or runs in place at home

## 2024-09-24 ENCOUNTER — APPOINTMENT (OUTPATIENT)
Dept: PULMONOLOGY | Facility: CLINIC | Age: 74
End: 2024-09-24
Payer: MEDICARE

## 2024-09-24 VITALS — DIASTOLIC BLOOD PRESSURE: 78 MMHG | SYSTOLIC BLOOD PRESSURE: 110 MMHG | RESPIRATION RATE: 16 BRPM

## 2024-09-24 VITALS — OXYGEN SATURATION: 94 % | HEART RATE: 62 BPM

## 2024-09-24 VITALS — WEIGHT: 223 LBS | BODY MASS INDEX: 28.02 KG/M2 | HEIGHT: 74.7 IN

## 2024-09-24 DIAGNOSIS — J44.9 CHRONIC OBSTRUCTIVE PULMONARY DISEASE, UNSPECIFIED: ICD-10-CM

## 2024-09-24 PROCEDURE — 99214 OFFICE O/P EST MOD 30 MIN: CPT | Mod: 25

## 2024-09-24 PROCEDURE — 94010 BREATHING CAPACITY TEST: CPT

## 2024-09-24 PROCEDURE — G0296 VISIT TO DETERM LDCT ELIG: CPT

## 2024-09-24 NOTE — DISCUSSION/SUMMARY
[COPD] : chronic obstructive pulmonary disease [Responding to Treatment] : responding to treatment [Stage IV (Very Severe)] : stage IV (very severe) [None] : There are no changes in medication management [Patient] : the patient [FreeTextEntry1] : Refusing LDCT [Stable] : stable

## 2024-09-24 NOTE — CONSULT LETTER
[Dear  ___] : Dear  [unfilled], [Please see my note below.] : Please see my note below. [Consult Closing:] : Thank you very much for allowing me to participate in the care of this patient.  If you have any questions, please do not hesitate to contact me. [Sincerely,] : Sincerely, [FreeTextEntry3] : Antione Wright MD FCCP\par  Pulmonary/Critical Care/Sleep Medicine\par  Department of Internal Medicine\par  \par  [facility]\par

## 2024-09-24 NOTE — COUNSELING
[ - Annual Lung Cancer Screening/Share Decision Making Discussion] : Annual Lung Cancer Screening/Share Decision Making Discussion. (I have advised this patient to have a Low Dose CT (LDCT) scan of the lungs and have discussed the following with the patient in a shared decision making discussion:   Benefits of Detection and Early Treatment: There is adequate evidence that annual screening for lung cancer with LDCT in a population of high-risk persons can prevent a substantial number of lung cancer-related deaths. The magnitude of benefit depends on the individual patient's risk for lung cancer, as those who are at highest risk are most likely to benefit. Screening cannot prevent most lung cancer-related deaths, and does not replace smoking cessation. Harms of Detection and Early Intervention and Treatment: The harms associated with LDCT screening include false-negative and false-positive results, incidental findings, over diagnosis, and radiation exposure. False-positive LDCT results occur in a substantial proportion of screened persons; 95% of all positive results do not lead to a diagnosis of cancer. In a high-quality screening program, further imaging can resolve most false-positive results; however, some patients may require invasive procedures. Radiation harms, including cancer resulting from cumulative exposure to radiation, vary depending on the age at the start of screening; the number of scans received; and the person's exposure to other sources of radiation, particularly other medical imaging.) [FreeTextEntry2] : Former smoker

## 2024-09-24 NOTE — PHYSICAL EXAM
[No Acute Distress] : no acute distress [Normal Oropharynx] : normal oropharynx [IV] : Mallampati Class: IV [Normal Appearance] : normal appearance [Neck Circumference: ___] : neck circumference: [unfilled] [No Neck Mass] : no neck mass [Normal Rate/Rhythm] : normal rate/rhythm [Normal S1, S2] : normal s1, s2 [No Resp Distress] : no resp distress [No Murmurs] : no murmurs [Clear to Auscultation Bilaterally] : clear to auscultation bilaterally [No Abnormalities] : no abnormalities [Benign] : benign [Normal Gait] : normal gait [No Clubbing] : no clubbing [No Cyanosis] : no cyanosis [No Edema] : no edema [FROM] : FROM [Normal Color/ Pigmentation] : normal color/ pigmentation [No Focal Deficits] : no focal deficits [Oriented x3] : oriented x3 [Normal Affect] : normal affect

## 2024-09-24 NOTE — PROCEDURE
[___%] : current visit [unfilled]U% [___%] : current visit [unfilled]U% [Spirometry] : stable [FreeTextEntry1] : \par

## 2024-09-24 NOTE — HISTORY OF PRESENT ILLNESS
[Former] : former [>= 20 pack years] : >= 20 pack years [Doing Well] : doing well [Difficulty Breathing During Exertion] : stable dyspnea on exertion [Feelings Of Weakness On Exertion] : stable exercise intolerance [Cough] : denies coughing [Coughing Up Sputum] : denies coughing up sputum [Wheezing] : resolved wheezing [Regional Soft Tissue Swelling Both Lower Extremities] : denies lower extremity edema [Cigarette ___ppd] : [unfilled] cigarette pack(s) per day [PFTs] : pulmonary function tests [TextBox_11] : 1 [TextBox_13] : >40 [ESS] : 0 [Wt Gain ___ Lbs] : no recent weight gain [de-identified] : PVD [de-identified] : walkinging 2.5miles per day or runs in place at home

## 2025-03-04 ENCOUNTER — APPOINTMENT (OUTPATIENT)
Dept: PULMONOLOGY | Facility: CLINIC | Age: 75
End: 2025-03-04
Payer: MEDICARE

## 2025-03-04 VITALS — WEIGHT: 210 LBS | BODY MASS INDEX: 26.39 KG/M2 | HEIGHT: 74.7 IN

## 2025-03-04 VITALS
OXYGEN SATURATION: 100 % | DIASTOLIC BLOOD PRESSURE: 78 MMHG | RESPIRATION RATE: 16 BRPM | HEART RATE: 59 BPM | SYSTOLIC BLOOD PRESSURE: 116 MMHG

## 2025-03-04 DIAGNOSIS — J44.9 CHRONIC OBSTRUCTIVE PULMONARY DISEASE, UNSPECIFIED: ICD-10-CM

## 2025-03-04 PROCEDURE — 94010 BREATHING CAPACITY TEST: CPT

## 2025-03-04 PROCEDURE — G0296 VISIT TO DETERM LDCT ELIG: CPT

## 2025-03-04 PROCEDURE — 99214 OFFICE O/P EST MOD 30 MIN: CPT | Mod: 25

## 2025-06-11 ENCOUNTER — RX RENEWAL (OUTPATIENT)
Age: 75
End: 2025-06-11